# Patient Record
Sex: MALE | Race: WHITE
[De-identification: names, ages, dates, MRNs, and addresses within clinical notes are randomized per-mention and may not be internally consistent; named-entity substitution may affect disease eponyms.]

---

## 2017-05-02 ENCOUNTER — HOSPITAL ENCOUNTER (EMERGENCY)
Dept: HOSPITAL 74 - FER | Age: 42
Discharge: HOME | End: 2017-05-02
Payer: COMMERCIAL

## 2017-05-02 VITALS — DIASTOLIC BLOOD PRESSURE: 91 MMHG | TEMPERATURE: 99.2 F | HEART RATE: 73 BPM | SYSTOLIC BLOOD PRESSURE: 136 MMHG

## 2017-05-02 VITALS — BODY MASS INDEX: 28.3 KG/M2

## 2017-05-02 DIAGNOSIS — M26.621: Primary | ICD-10-CM

## 2017-05-02 DIAGNOSIS — F32.9: ICD-10-CM

## 2017-05-02 PROCEDURE — 3E0233Z INTRODUCTION OF ANTI-INFLAMMATORY INTO MUSCLE, PERCUTANEOUS APPROACH: ICD-10-PCS

## 2017-05-02 NOTE — PDOC
History of Present Illness





- General


History Source: Patient


Exam Limitations: No Limitations





- History of Present Illness


Initial Comments: 


05/02/17 20:35


The patient is a 41 year old male with no PMHx who presents to the ED with 

right ear pain and right sided jaw pain for 4 days. The patient states the pain 

began while eating. Since then, the pain has been constant. The pain is 

exacerbated by chewing. He reports he recently got a new pillow a few months ago

, which he believes may be the cause of the pain. He reports his wife recently 

had similar pain, which resolved after she stopped using the pillow. He also 

reports that he notices clicking in his jaw, and it feels like there is fluid 

in his ear, and feels off balance. He reports that he took Motrin and Advil 

with no relief. He reports he has been under a lot of stress lately, which may 

be contributing to the irritation. He denies tooth pain, throat pain, headache. 

He denies fever, chills, nausea, vomiting, diarrhea, constipation.








<Kristina Diamond - Last Filed: 05/02/17 20:35>





<Deidre Gomez - Last Filed: 05/06/17 02:39>





- General


Chief Complaint: Ear Problem


Stated Complaint: RT EAR RADIATING TO RT JAW PAIN


Time Seen by Provider: 05/02/17 19:15





Past History





<Kristina Diamond - Last Filed: 05/02/17 20:35>





- Past Medical History


Psychiatric Problems: Yes (DEPRESSION)


Other medical history: HERNIATED DISCS, LOW BACK AND NECK





- Psycho/Social/Smoking Cessation Hx


Anxiety: No


Suicidal Ideation: No


Smoking History: Never smoked


Information on smoking cessation initiated: No


Hx Alcohol Use: No


Drug/Substance Use Hx: No


Substance Use Type: None





<Deidre Gomez - Last Filed: 05/06/17 02:39>





- Past Medical History


Allergies/Adverse Reactions: 


 Allergies











Allergy/AdvReac Type Severity Reaction Status Date / Time


 


No Known Allergies Allergy   Verified 12/18/16 15:11











Home Medications: 


Ambulatory Orders





Ibuprofen [Motrin -] 600 mg PO TID PRN #21 tablet 12/18/16 


Oxycodone HCl/Acetaminophen [Percocet 5-325 mg Tablet] 1 tab PO Q6H PRN #12 

tablet MDD 4 tabs 12/18/16 


Diclofenac Sodium 75 mg PO TID PRN #20 tablet. 05/02/17 


Neomycin/Polymyxn/Hc [Cortisporin Otic Suspenstion -] 5 drop AD Q4HWA #1 bottle 

05/02/17 











**Review of Systems





- Review of Systems


Able to Perform ROS?: Yes


Comments:: 


05/02/17 20:35


CONSTITUTIONAL:


Absent: fever, no chills, no fatigue


EYES:


Absent: visual changes


ENT:


Present: right ear pain, fluid in right ear


Absent: no sore throat, tooth pain


CARDIOVASCULAR:


Absent: chest pain, no palpitations


RESPIRATORY:


Absent: cough, no SOB


GI:


Absent: abdominal pain, no nausea, no vomiting, no constipation, no diarrhea


GENITOURINARY:


Absent: dysuria, no frequency, no hematuria


MUSCULOSKELETAL:


Present: right jaw pain


Absent: back pain, no myalgia


SKIN:


Absent: rash


NEURO:


Present: off balance


Absent: headache








<DiamondKristina A - Last Filed: 05/02/17 20:35>





*Physical Exam





- Vital Signs


 Last Vital Signs











Temp Pulse Resp BP Pulse Ox


 


 99.2 F   73   18   136/91   96 


 


 05/02/17 18:45  05/02/17 18:45  05/02/17 18:45  05/02/17 18:45  05/02/17 18:45














- Physical Exam


Comments: 





05/02/17 20:37


GENERAL: The patient is awake, alert, and fully oriented, in no acute distress.


HEAD: Normal with no signs of trauma.


EYES: Pupils equal, round and reactive to light, extraocular movements intact, 

sclera anicteric, conjunctiva clear with no pallor.


ENT: Right ear canal is mildly erythematous, with no edema. Right TM is normal. 

Left ear is normal. Tenderness of the right TMJ joint, especially with opening 

of the jaw. Mild asymmetry with opening of the jaw. Otherwise pharyngeal exam 

was normal, no other tenderness on mandible or maxilla. nares patent, 

oropharynx clear without exudates.  Moist mucous membranes.


NECK: Normal range of motion, supple without lymphadenopathy, JVD, or masses.


LUNGS: Breath sounds equal, clear to auscultation bilaterally.  No wheeze/

crackles.


HEART: Regular rate and rhythm, normal S1 and S2 without murmur or rub.


ABDOMEN: Soft/nontender/nondistended. BS wnl.  No guarding or rebound.  No 

palpable masses. No hepatosplenomegaly.


EXTREMITIES: Normal range of motion, no edema.  No clubbing or cyanosis. No 

cords, erythema, or tenderness.


NEUROLOGICAL: Cranial nerves II through XII grossly intact.  Normal speech, 

normal gait.


PSYCH: Normal mood, normal affect.


SKIN: Warm, Dry, normal turgor, no rashes or lesions noted.





<Kristina Diamond - Last Filed: 05/02/17 20:35>





- Vital Signs


 Last Vital Signs











Temp Pulse Resp BP Pulse Ox


 


 99.2 F   73   18   136/91   96 


 


 05/02/17 18:45  05/02/17 18:45  05/02/17 18:45  05/02/17 18:45  05/02/17 18:45














<Deidre Gomez - Last Filed: 05/06/17 02:39>





ED Treatment Course





- Medications


Given in the ED: 


ED Medications














Discontinued Medications














Generic Name Dose Route Start Last Admin





  Trade Name Ann  PRN Reason Stop Dose Admin


 


Ketorolac Tromethamine  60 mg 05/02/17 20:11 05/02/17 20:15





  Toradol Injection -  IM 05/02/17 20:12  60 mg





  ONCE ONE   Administration














<Kristina Diamond - Last Filed: 05/02/17 20:35>





Medical Decision Making





- Medical Decision Making


Documentation has been prepared under my direction and personally reviewed by 

me in its entirety. I attest that this documented accurately reflects all work, 

treatment, procedures and medical decision making performed by me.








As noted above, this 41-year-old man presents with progressive pain around the 

right temporomandibular joint.  He has some crepitus and tenderness of this 

joint on exam.  He also has evidence of mild inflammation of the right external 

auditory canal.  Right TM is normal as is the rest of the exam.


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 

however, because of the inflammation in the auditory canal, he he may have some 

mild otitis externa.


Patient will be given a prescription for diclofenac 75 mg to be used as needed 

for pain; he will also be given Cortisporin otic suspension to be used every 4 

hours while awake.  He should follow up with an ear nose and throat group.  He 

has been given referral information for Dr. Berger [facility].  He can also follow-

up with his dentist/oral surgeon for his TMJ pain.








<Deidre Gomez - Last Filed: 05/06/17 02:39>





*DC/Admit/Observation/Transfer





- Attestations


Scribe Attestion: 





05/02/17 20:37





Documentation prepared by Kristina Diamond, acting as medical scribe for Deidre Gomez MD.





<Kristina Diamond - Last Filed: 05/02/17 20:35>





<Deidre Gomez - Last Filed: 05/06/17 02:39>


Diagnosis at time of Disposition: 


 Arthralgia of right temporomandibular joint





Otitis externa


Qualifiers:


 Otitis externa type: unspecified type Laterality: right Chronicity: acute 

Qualified Code(s): H60.501 - Unspecified acute noninfective otitis externa, 

right ear





- Discharge Dispostion


Disposition: HOME


Condition at time of disposition: Stable





- Prescriptions


Prescriptions: 


Neomycin/Polymyxn/Hc [Cortisporin Otic Suspenstion -] 5 drop AD Q4HWA #1 bottle


Diclofenac Sodium 75 mg PO TID PRN #20 tablet.dr


 PRN Reason: Pain





- Referrals


Referrals: 


Sonny Rangel MD [Primary Care Provider] - 


Martin Berger MD [Staff Physician] - Call tomorrow





- Patient Instructions


Printed Discharge Instructions:  Temporomandibular Disorder


Additional Instructions: 


Diclofenac 75 mg up to 3 times a day as needed for pain (take with food


Cortisporin eardrops: 4 drops in the ear every 4 hours while awake


Follow-up with ENT (Dr. Berger group) within 5 days


Alternatively, can see your dentist or oral surgeon for right jaw pain


Follow up with Dr. Rangel within the next week


Return to ER if symptoms worsen

## 2017-09-12 ENCOUNTER — HOSPITAL ENCOUNTER (EMERGENCY)
Dept: HOSPITAL 74 - FER | Age: 42
Discharge: HOME | End: 2017-09-12
Payer: COMMERCIAL

## 2017-09-12 VITALS — SYSTOLIC BLOOD PRESSURE: 134 MMHG | DIASTOLIC BLOOD PRESSURE: 85 MMHG | TEMPERATURE: 98.6 F | HEART RATE: 84 BPM

## 2017-09-12 VITALS — BODY MASS INDEX: 28.3 KG/M2
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
## 2017-09-12 DIAGNOSIS — S22.32XA: Primary | ICD-10-CM
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
## 2017-09-12 NOTE — PDOC
History of Present Illness





- General


Chief Complaint: Injury


Stated Complaint: FELL,LEFT RIB CAGE PAIN


Time Seen by Provider: 09/12/17 12:58


History Source: Patient


Exam Limitations: No Limitations





- History of Present Illness


Initial Comments: 


41 yo M no significant PMH presents with L sided rib pain. He states that he 

tripped while walking down stairs 2 days ago, fell onto metal hand railing. He 

noticed bruising of his L chest wall, and states that it is extremely painful. 

Worse with deep inspiration and palpation, better with rest. He has not taken 

anything for the pain. He sought evaluation when it did not improve on its own.








Past History





- Past Medical History


Allergies/Adverse Reactions: 


 Allergies











Allergy/AdvReac Type Severity Reaction Status Date / Time


 


No Known Allergies Allergy   Verified 09/12/17 12:54











Home Medications: 


Ambulatory Orders





Hydroxyzine HCl [Atarax -] 25 mg PO DAILY 09/12/17 


Ibuprofen [Motrin -] 600 mg PO TID PRN #21 tablet 09/12/17 


Oxycodone HCl/Acetaminophen [Percocet 5-325 mg Tablet] 1 tab PO Q6H PRN #12 

tablet MDD 4 tabs 09/12/17 


Quetiapine Fumarate [Seroquel] 100 tab PO HS 09/12/17 


Venlafaxine HCl [Effexor -] 50 mg PO DAILY 09/12/17 








Psychiatric Problems: Yes (DEPRESSION)





- Psycho/Social/Smoking Cessation Hx


Anxiety: No


Suicidal Ideation: No


Smoking History: Never smoked


Hx Alcohol Use: No


Drug/Substance Use Hx: No


Substance Use Type: None





**Review of Systems





- Review of Systems


Able to Perform ROS?: Yes


Comments:: 


GENERAL/CONSTITUTIONAL: No fever or chills. No weakness.


HEAD, EYES, EARS, NOSE AND THROAT: No change in vision. No ear pain or 

discharge. No sore throat.


CARDIOVASCULAR: +Chest pain. No shortness of breath.


RESPIRATORY: No cough, wheezing, or hemoptysis.


MUSCULOSKELETAL: No joint or muscle swelling or pain. No neck or back pain.


SKIN: No rash


NEUROLOGIC: No headache, vertigo, loss of consciousness, or change in strength/

sensation.











*Physical Exam





- Physical Exam


Comments: 


GENERAL: Awake, alert, and fully oriented, in no acute distress


HEAD: No signs of trauma


EYES: PERRLA, EOMI, sclera anicteric, conjunctiva clear


LUNGS: Breath sounds equal, clear to auscultation bilaterally.  No wheezes, and 

no crackles. +Splinting with deep inspiration.


HEART: Regular rate and rhythm, normal S1 and S2, no murmurs, rubs or gallops


EXTREMITIES: Normal range of motion, no edema.  No clubbing or cyanosis. No 

cords, erythema, or tenderness


NEUROLOGICAL: Cranial nerves II through XII grossly intact.  Normal speech, 

normal gait


SKIN: Warm, Dry, normal turgor, no rashes or lesions noted.











*DC/Admit/Observation/Transfer


Diagnosis at time of Disposition: 


Rib fracture


Qualifiers:


 Encounter type: initial encounter Rib fracture type: single rib Fracture type: 

closed Laterality: left Qualified Code(s): S22.32XA - Fracture of one rib, left 

side, initial encounter for closed fracture





- Discharge Dispostion


Disposition: HOME


Condition at time of disposition: Stable


Admit: No





- Prescriptions


Prescriptions: 


Ibuprofen [Motrin -] 600 mg PO TID PRN #21 tablet


 PRN Reason: Pain


Oxycodone HCl/Acetaminophen [Percocet 5-325 mg Tablet] 1 tab PO Q6H PRN #12 

tablet MDD 4 tabs


 PRN Reason: Severe Pain





- Patient Instructions


Printed Discharge Instructions:  DI for Rib Fracture

## 2017-12-18 ENCOUNTER — HOSPITAL ENCOUNTER (EMERGENCY)
Dept: HOSPITAL 74 - FER | Age: 42
Discharge: HOME | End: 2017-12-18
Payer: COMMERCIAL

## 2017-12-18 VITALS — TEMPERATURE: 98 F | HEART RATE: 69 BPM | DIASTOLIC BLOOD PRESSURE: 93 MMHG | SYSTOLIC BLOOD PRESSURE: 129 MMHG

## 2017-12-18 VITALS — BODY MASS INDEX: 29 KG/M2
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
## 2017-12-18 DIAGNOSIS — F32.9: ICD-10-CM

## 2017-12-18 DIAGNOSIS — S46.911A: Primary | ICD-10-CM

## 2017-12-18 NOTE — PDOC
History of Present Illness





- General


Chief Complaint: Pain, Acute


Stated Complaint: right shoulder pain


Time Seen by Provider: 12/18/17 07:15


History Source: Patient


Exam Limitations: No Limitations





- History of Present Illness


Initial Comments: 





12/18/17 07:26


42y M no significant pmhx presents with complaint of R shoulder pain. The pt 

was walking his dog when he slipped on ice, lasting on his R shoulder radiating 

to his upper R back and R chest. Pt complains of pain to the lateral aspect of 

his R shoulder and radiates down his arm. He denies anyhead injury, neck pain, 

back pain, numnbess, weakness, LOC.  











Past History





- Past Medical History


Allergies/Adverse Reactions: 


 Allergies











Allergy/AdvReac Type Severity Reaction Status Date / Time


 


No Known Allergies Allergy   Verified 09/12/17 12:54











Home Medications: 


Ambulatory Orders





Quetiapine Fumarate [Seroquel] 100 tab PO HS 09/12/17 


Venlafaxine HCl [Effexor -] 50 mg PO DAILY 09/12/17 








COPD: No


Psychiatric Problems: Yes (DEPRESSION)





- Suicide/Smoking/Psychosocial Hx


Smoking History: Never smoked


Have you smoked in the past 12 months: No


Hx Alcohol Use: No


Drug/Substance Use Hx: No


Substance Use Type: None





**Review of Systems





- Review of Systems


Able to Perform ROS?: Yes


Constitutional: No: Symptoms Reported, Malaise, Weakness


HEENTM: No: Eye Pain, Blurred Vision, Recent change in vision, Double Vision


ABD/GI: No: Nausea, Vomiting, Abdominal cramping


Musculoskeletal: Yes: Joint Pain.  No: Back Pain, Neck Pain


Integumentary: No: Bruising, Change in Color


Neurological: Yes: Paresthesia.  No: Numbness, Weakness





*Physical Exam





- Vital Signs


 Last Vital Signs











Temp Pulse Resp BP Pulse Ox


 


 98 F   69   16   129/93   100 


 


 12/18/17 07:13  12/18/17 07:13  12/18/17 07:13  12/18/17 07:13  12/18/17 07:13














- Physical Exam


Comments: 





12/18/17 07:38


GENERAL: The patient is awake, alert, and fully oriented, Nontoxic - in no 

acute distress.


HEAD: Normocephalic, atraumatic.


EYES: extraocular movements intact,


NECK: Normal range of motion, supple, no focal tenderness in the posterior spine


ABDOMEN: Soft, nontender, 


EXTREMITIES: Normal range of motion, no edema. mild tenderness to R anterior 

shoulder and clavical. 


NEUROLOGICAL: No facial assymetry, Normal speech, sensation/motor symmetric


SKIN: Warm, Dry, normal turgor, No bruising noted








ED Treatment Course





- RADIOLOGY


Radiology Studies Ordered: 














 Category Date Time Status


 


 CLAVICLE-RIGHT SIDE [RAD] [facility]  12/18/17 07:22 Ordered


 


 SHOULDER-RIGHT [RAD] [facility] Radiology  12/18/17 07:22 Ordered














Medical Decision Making





- Medical Decision Making





12/18/17 07:42


xray shoulder/clavical to ro fx


ibuprofen for pain   


12/18/17 08:26





on xray there are no signs of fx


there is slight widening of ac joint (~5mm) with slight elevation of clavical - 

however pt does not have significant tenderness at the area.


suspect either strain vs. mild ac separation


will recommend supportive care, NSAIDS and ortho fu in 5 days if not feeling 

better





return precautions were discussed











*DC/Admit/Observation/Transfer


Diagnosis at time of Disposition: 


Right shoulder strain


Qualifiers:


 Encounter type: initial encounter Qualified Code(s): S46.911A - Strain of 

unspecified muscle, fascia and tendon at shoulder and upper arm level, right arm

, initial encounter








- Discharge Dispostion


Disposition: HOME


Condition at time of disposition: Improved


Admit: No





- Referrals


Referrals: 


Robinson Sherman MD [Staff Physician] - 


Cisco Ceron MD [Staff Physician] - 





- Patient Instructions


Printed Discharge Instructions:  DI for Shoulder Pain


Additional Instructions: 


Please use ice to minimze swelling/inflammation


Use motrin 400mg every 6 hrs for the next 2 days.


No heavy lifting with your right arm until pain resolves.


If your pain is not resolved by 5 days, see an orthopedic surgeon for 

reeavluation. 





Print Language: ENGLISH





- Post Discharge Activity

## 2018-05-06 ENCOUNTER — HOSPITAL ENCOUNTER (EMERGENCY)
Dept: HOSPITAL 74 - FER | Age: 43
Discharge: HOME | End: 2018-05-06
Payer: COMMERCIAL

## 2018-05-06 VITALS — HEART RATE: 73 BPM | DIASTOLIC BLOOD PRESSURE: 94 MMHG | SYSTOLIC BLOOD PRESSURE: 142 MMHG | TEMPERATURE: 98.4 F

## 2018-05-06 VITALS — BODY MASS INDEX: 25.7 KG/M2

## 2018-05-06 DIAGNOSIS — F32.9: ICD-10-CM

## 2018-05-06 DIAGNOSIS — F20.9: ICD-10-CM

## 2018-05-06 DIAGNOSIS — E86.0: Primary | ICD-10-CM

## 2018-05-06 LAB
ALBUMIN SERPL-MCNC: 4.4 G/DL (ref 3.5–5)
ALP SERPL-CCNC: 47 U/L (ref 32–92)
ALT SERPL-CCNC: 29 U/L (ref 10–40)
ANION GAP SERPL CALC-SCNC: 6 MMOL/L (ref 8–16)
AST SERPL-CCNC: 27 U/L (ref 10–42)
BASOPHILS # BLD: 2.2 % (ref 0–2)
BILIRUB SERPL-MCNC: 0.6 MG/DL (ref 0.2–1)
BUN SERPL-MCNC: 13 MG/DL (ref 7–18)
CALCIUM SERPL-MCNC: 9.3 MG/DL (ref 8.4–10.2)
CHLORIDE SERPL-SCNC: 102 MMOL/L (ref 98–107)
CO2 SERPL-SCNC: 27 MMOL/L (ref 22–28)
COLOR UR: YELLOW
CREAT SERPL-MCNC: 0.8 MG/DL (ref 0.6–1.3)
DEPRECATED RDW RBC AUTO: 12.8 % (ref 11.9–15.9)
EOSINOPHIL # BLD: 3.7 % (ref 0–4.5)
GLUCOSE SERPL-MCNC: 102 MG/DL (ref 74–106)
HCT VFR BLD CALC: 45.4 % (ref 35.4–49)
HGB BLD-MCNC: 15.6 GM/DL (ref 11.7–16.9)
LYMPHOCYTES # BLD: 34.6 % (ref 8–40)
MCH RBC QN AUTO: 28.5 PG (ref 25.7–33.7)
MCHC RBC AUTO-ENTMCNC: 34.3 G/DL (ref 32–35.9)
MCV RBC: 83.1 FL (ref 80–96)
MONOCYTES # BLD AUTO: 7 % (ref 3.8–10.2)
NEUTROPHILS # BLD: 52.5 % (ref 42.8–82.8)
PH UR: 5.5 [PH] (ref 4.5–8)
PLATELET # BLD AUTO: 291 K/MM3 (ref 134–434)
PMV BLD: 7.6 FL (ref 7.5–11.1)
POTASSIUM SERPLBLD-SCNC: 4.2 MMOL/L (ref 3.5–5.1)
PROT SERPL-MCNC: 8.1 G/DL (ref 6.4–8.3)
RBC # BLD AUTO: 5.46 M/MM3 (ref 4–5.6)
SODIUM SERPL-SCNC: 135 MMOL/L (ref 136–145)
SP GR UR: 1.01 (ref 1–1.02)
UROBILINOGEN UR STRIP-MCNC: 0.2 MG/DL (ref 0.2–1)
WBC # BLD AUTO: 7.5 K/MM3 (ref 4–10.8)

## 2018-05-06 PROCEDURE — 3E0337Z INTRODUCTION OF ELECTROLYTIC AND WATER BALANCE SUBSTANCE INTO PERIPHERAL VEIN, PERCUTANEOUS APPROACH: ICD-10-PCS | Performed by: STUDENT IN AN ORGANIZED HEALTH CARE EDUCATION/TRAINING PROGRAM

## 2018-05-06 NOTE — EKG
Test Reason : 

Blood Pressure : ***/*** mmHG

Vent. Rate : 058 BPM     Atrial Rate : 058 BPM

   P-R Int : 162 ms          QRS Dur : 086 ms

    QT Int : 404 ms       P-R-T Axes : 080 025 -01 degrees

   QTc Int : 396 ms

 

SINUS BRADYCARDIA

OTHERWISE NORMAL ECG

WHEN COMPARED WITH ECG OF 21-AUG-2005 18:03,

VENT. RATE HAS DECREASED BY  40 BPM

T WAVE AMPLITUDE HAS INCREASED IN ANTEROLATERAL LEADS

Confirmed by MD Mario, Bridger ([id number]) on 5/6/2018 4:11:48 PM

 

Referred By: STANISLAW GALE           Confirmed By:Bridger Martin MD

## 2018-05-06 NOTE — PDOC
History of Present Illness





- General


Chief Complaint: Lightheaded


Stated Complaint: DIZZY


Time Seen by Provider: 05/06/18 11:52


History Source: Patient


Exam Limitations: No Limitations





- History of Present Illness


Initial Comments: 





05/06/18 12:06


42y M hx of psychiatric disoroders (depression, schizophrenia) presents with 

complaint of not feeling well. for the past 3 days pt notes that for the past 

few days he has been feeling 'dehydrated', that he describes as feeling light 

headed, fatigued and more tired than usual. pt denies any double vision, 

headache, cp, sob, newberry, n/v, diaphoresis, diarrhea, melena, bpr, dysuria, 

hematuria, fever/chills, cough, rashes, sore throat, congestion, numbness/

tingling/weakness. pt does endorses some occasional knee pain since his fall 

last year but nothing new recently 





pt also notes that he stopped taking his meds approx 2 weeks ago because he 

didnt feel wella nd thought it was his meds cusing his problems.  





pt presented today as the next appt his doctor had was later this week





Past History





- Past Medical History


Allergies/Adverse Reactions: 


 Allergies











Allergy/AdvReac Type Severity Reaction Status Date / Time


 


No Known Allergies Allergy   Verified 05/06/18 11:56











Home Medications: 


Ambulatory Orders





Quetiapine Fumarate [Seroquel] 100 tab PO HS 09/12/17 


Venlafaxine HCl [Effexor -] 50 mg PO DAILY 09/12/17 








COPD: No


Psychiatric Problems: Yes (DEPRESSION)





- Suicide/Smoking/Psychosocial Hx


Smoking History: Never smoked


Have you smoked in the past 12 months: No


Hx Alcohol Use: No


Drug/Substance Use Hx: No


Substance Use Type: None





**Review of Systems





- Review of Systems


Able to Perform ROS?: Yes


Comments:: 





05/06/18 12:09


Constitutional - +malaise no reported Fever, Chills, 


HEENT: no reported vision changes, sore throat


Respiratory: no reported cough, sob, hemoptysis


Cardiac: no reported chest pain, palpitations, light headedness, leg swelling


Abd/GI: no reported abd pain, nausea, vomiting, blood per rectum, melena, 

diarrhea


: no reported dysuria, frequency, discharge


Musculskelatal - +occasional R knee pain no reported back pain,


skin - no reported bruising, erythema, rash


neurological: no reported headache, numbness, focal weakness, tingling, ataxia, 


hematologic: no reported  easy bruising, easy bleeding








*Physical Exam





- Vital Signs


 Last Vital Signs











Temp Pulse Resp BP Pulse Ox


 


 98.4 F   73   16   142/94   98 


 


 05/06/18 11:50  05/06/18 11:50  05/06/18 11:50  05/06/18 11:50  05/06/18 11:50














- Physical Exam


Comments: 





05/06/18 12:10


GENERAL: The patient is awake, alert, and fully oriented, Nontoxic - in no 

acute distress.


HEAD: Normocephalic, atraumatic.


EYES: extraocular movements intact, sclera anicteric, conjunctiva clear.


ENT: Normal voice,  dry mucous membranes.


NECK: Normal range of motion, supple


LUNGS: Breath sounds equal, clear to auscultation bilaterally.  No wheezes, no 

rhonchi, no rales.


HEART: Regular rate and rhythm, normal S1 and S2 without murmur, rub or gallop.


ABDOMEN: Soft, nontender, normoactive bowel sounds.  No guarding, no rebound.  

. No CVA tenderness


EXTREMITIES: Normal range of motion, no edema.  No clubbing or cyanosis. No 

cords, erythema, or tenderness.


NEUROLOGICAL: No facial assymetry, Normal speech, 


PSYCH: Normal mood, normal affect.


SKIN: Warm, Dry, normal turgor,





**Heart Score/ECG Review





- ECG Impressions


Comment:: 





05/06/18 13:18


Twelve-lead EKG was performed and reviewed by me. 


There is normal sinus rhythm with a rate of 58


T wave inversion in lead 3





ED Treatment Course





- LABORATORY


CBC & Chemistry Diagram: 


 05/06/18 12:20





 05/06/18 12:20





Medical Decision Making





- Medical Decision Making





05/06/18 12:10


42y M hx of depression, schizophrnia presents with feeling malaise/fatigue/

lightheaded (nonvertignous) without other focal complaints


exam unremarkble besides mildly dry mm


ddx anemia, metabolic derangement, occult infection


will screen for acs with ekg


will give fluids for hdyration


willre assess


05/06/18 13:19


pt feeling improved


The patient's blood work was unremarkable,


will dc the pt with pmd fu





I discussed the physical exam findings, ancillary test results and final 

diagnoses with the patient. I answered all of the patient's questions. The 

patient was satisfied with the care received and felt comfortable with the 

discharge plan and treatment plan. The patient will call their primary care 

physician within 24 hours to arrange follow-up and will return to the Emergency 

Department with any new, persistent or worsening symptoms. 








*DC/Admit/Observation/Transfer


Diagnosis at time of Disposition: 


 Dehydration








- Discharge Dispostion


Disposition: HOME


Condition at time of disposition: Improved


Admit: No





- Referrals


Referrals: 


Sonny Rangel MD [Staff Physician] - 





- Patient Instructions


Printed Discharge Instructions:  DI for Dehydration -- Adult


Additional Instructions: 


Return to the emergency department immediately with ANY new, persistent or 

worsening symptoms.





Make sure you are staying well hydrated.





You MUST call and follow up with your doctor tomorrow for further evaluation of 

your symptoms. Results were discussed with you. Please make sure your doctor 

reviews the results of your emergency evaluation.





Print Language: ENGLISH





- Post Discharge Activity

## 2019-03-14 ENCOUNTER — HOSPITAL ENCOUNTER (EMERGENCY)
Dept: HOSPITAL 74 - FER | Age: 44
Discharge: HOME | End: 2019-03-14
Payer: COMMERCIAL

## 2019-03-14 VITALS — DIASTOLIC BLOOD PRESSURE: 100 MMHG | HEART RATE: 64 BPM | SYSTOLIC BLOOD PRESSURE: 140 MMHG

## 2019-03-14 VITALS — TEMPERATURE: 98.5 F

## 2019-03-14 VITALS — BODY MASS INDEX: 27.7 KG/M2

## 2019-03-14 DIAGNOSIS — F20.9: ICD-10-CM

## 2019-03-14 DIAGNOSIS — F32.9: ICD-10-CM

## 2019-03-14 DIAGNOSIS — M21.372: Primary | ICD-10-CM

## 2019-03-14 LAB
ALBUMIN SERPL-MCNC: 4 G/DL (ref 3.4–5)
ALP SERPL-CCNC: 41 U/L (ref 45–117)
ALT SERPL-CCNC: 33 U/L (ref 13–61)
ANION GAP SERPL CALC-SCNC: 6 MMOL/L (ref 8–16)
AST SERPL-CCNC: 29 U/L (ref 15–37)
BASOPHILS # BLD: 0.8 % (ref 0–2)
BILIRUB SERPL-MCNC: 0.3 MG/DL (ref 0.2–1)
BUN SERPL-MCNC: 14 MG/DL (ref 7–18)
CALCIUM SERPL-MCNC: 8.9 MG/DL (ref 8.5–10)
CHLORIDE SERPL-SCNC: 103 MMOL/L (ref 98–107)
CO2 SERPL-SCNC: 27 MMOL/L (ref 21–32)
CREAT SERPL-MCNC: 0.7 MG/DL (ref 0.55–1.3)
DEPRECATED RDW RBC AUTO: 13.4 % (ref 11.9–15.9)
EOSINOPHIL # BLD: 4.4 % (ref 0–4.5)
GLUCOSE SERPL-MCNC: 91 MG/DL (ref 74–106)
HCT VFR BLD CALC: 43.5 % (ref 35.4–49)
HGB BLD-MCNC: 14.4 GM/DL (ref 11.7–16.9)
LYMPHOCYTES # BLD: 37.7 % (ref 8–40)
MCH RBC QN AUTO: 28.3 PG (ref 25.7–33.7)
MCHC RBC AUTO-ENTMCNC: 33.1 G/DL (ref 32–35.9)
MCV RBC: 85.3 FL (ref 80–96)
MONOCYTES # BLD AUTO: 8.7 % (ref 3.8–10.2)
NEUTROPHILS # BLD: 48.4 % (ref 42.8–82.8)
PLATELET # BLD AUTO: 238 K/MM3 (ref 134–434)
PMV BLD: 8.4 FL (ref 7.5–11.1)
POTASSIUM SERPLBLD-SCNC: 3.9 MMOL/L (ref 3.5–5.1)
PROT SERPL-MCNC: 7.3 G/DL (ref 6.4–8.2)
RBC # BLD AUTO: 5.1 M/MM3 (ref 4–5.6)
SODIUM SERPL-SCNC: 136 MMOL/L (ref 136–145)
WBC # BLD AUTO: 7.8 K/MM3 (ref 4–10.8)

## 2019-03-14 PROCEDURE — 3E0337Z INTRODUCTION OF ELECTROLYTIC AND WATER BALANCE SUBSTANCE INTO PERIPHERAL VEIN, PERCUTANEOUS APPROACH: ICD-10-PCS | Performed by: EMERGENCY MEDICINE

## 2019-03-14 PROCEDURE — 3E033NZ INTRODUCTION OF ANALGESICS, HYPNOTICS, SEDATIVES INTO PERIPHERAL VEIN, PERCUTANEOUS APPROACH: ICD-10-PCS | Performed by: EMERGENCY MEDICINE

## 2019-03-14 NOTE — PDOC
History of Present Illness





- General


Chief Complaint: Weakness


Stated Complaint: FEELS  WEAK  LEFT LEG PAIN


Time Seen by Provider: 03/14/19 07:55





- History of Present Illness


Initial Comments: 





03/14/19 14:14





43 years old past medical history significant for schizophrenia has past 

medical history notable for cervical and lumbar disc disease no history of 

trauma presents to the ED with new weakness to his left foot and left leg for 2-

3 days. No direct history of trauma no new injuries patient states he has been 

feeling generally a little bit weak but has noticed significant weakness in his 

foot and leg





No fever no chills no chest pain or shortness of breath no recent surgeries no 

recent injections no recent hospitalizations. Symptoms are mild to moderate 

persistent concent no exacerbating or alleviating factors.








Past History





- Past Medical History


Allergies/Adverse Reactions: 


 Allergies











Allergy/AdvReac Type Severity Reaction Status Date / Time


 


No Known Allergies Allergy   Verified 03/14/19 07:57











Home Medications: 


Ambulatory Orders





Quetiapine Fumarate [Seroquel] 100 tab PO HS 09/12/17 


Methylprednisolone [Medrol Dose Amari] 4 mg PO ASDIR #21 tablet 03/14/19 








COPD: No


Psychiatric Problems: Yes (DEPRESSION, PANIC ATTACKS,SCHITZOPHRENIA)





- Suicide/Smoking/Psychosocial Hx


Smoking History: Never smoked


Have you smoked in the past 12 months: No


Information on smoking cessation initiated: No


Hx Alcohol Use: No


Drug/Substance Use Hx: No


Substance Use Type: None





**Review of Systems





- Review of Systems


Comments:: 





03/14/19 14:14





ROS:  A complete review of 10 out of 10 review of systems is taken and is 

negative apart from what is previously mentioned below and in the HPI.








*Physical Exam





- Vital Signs


 Last Vital Signs











Temp Pulse Resp BP Pulse Ox


 


 98.5 F   90   16   144/101 H  97 


 


 03/14/19 07:49  03/14/19 07:49  03/14/19 07:49  03/14/19 07:49  03/14/19 07:49














- Physical Exam


Comments: 





03/14/19 14:14





Vitals: Triage Vital signs reviewed  


General Appearance:  no acute distress, well nourished well developed, 


Head: Atraumatic, 


Neck:  Supple;No Nucal rigidity


Chest Wall: Nontender


Cardiac: Regular rate and rhythym, no murmurs, no rubs, no gallops, 


Lungs: Clear to auscultation bilateral, good air movement bilaterally,


Abdomen:  Soft, non distended, normal bowel sounds, non tender to palpation


Rectal: Good rectal tone, no S1 and S2 anesthesia


Extremities: Full range of motion to all extremities, no cyanosis, clubbing, or 

edema


Skin:  Warm and dry, no rashes or lesions, no rash, no petechiae


Neuro: AOX3; Cranial Nerves 2-12 grossly intact,3/5 strength to left foot with 

dorsiflexion normal with plantar flexion, 4-5 strength with extension at the 

knee, reflexes intact,, Sensation intact to all extremities,gait normal


Psych:  normal mood, normal affect





Moderate Sedation





- Procedure Monitoring


Vital Signs: 


Procedure Monitoring Vital Signs











Temperature  98.5 F   03/14/19 07:49


 


Pulse Rate  90   03/14/19 07:49


 


Respiratory Rate  16   03/14/19 07:49


 


Blood Pressure  144/101 H  03/14/19 07:49


 


O2 Sat by Pulse Oximetry (%)  97   03/14/19 07:49











ED Treatment Course





- LABORATORY


CBC & Chemistry Diagram: 


 03/14/19 09:00





 03/14/19 09:00





Medical Decision Making





- Medical Decision Making





03/14/19 14:22





Exam concerning for left foot drop there is slight weakness at the knee as well 

this may be secondary to pain given history of underlying disc disease case 

discussed with neurosurgery recommends MRI cervical and MRI lumbar if any acute 

findings patient may require emergent surgery





Dr. Parson following





03/14/19 14:47





MRI reviewed by neurosurgeon Dr. Harrell no significant disc disease in the LS-

spine but there is some moderate disc disease and cervical disease that is 

necessary for consultation but on a nonemergent basis





Recommends Medrol Dosepak and he will see the patient is office within 1 week





Findings, the need for follow-up and strict return instructions discussed with 

patient.








*DC/Admit/Observation/Transfer


Diagnosis at time of Disposition: 


 Foot drop, left








- Discharge Dispostion


Disposition: HOME


Decision to Admit order: No





- Referrals


Referrals: 


Ellis Palma MD, [facility] [Staff Physician] - 





- Patient Instructions


Printed Discharge Instructions:  Herniated Disc


Additional Instructions: 


Call today to follow-up with Dr. Palma this week.  Take Medrol Dosepak as 

prescribed. Okay to stop medication if any adverse psychiatric side effects





Return to ED for any severe worsening symptoms or for any concerns.





- Post Discharge Activity

## 2020-07-03 ENCOUNTER — HOSPITAL ENCOUNTER (EMERGENCY)
Dept: HOSPITAL 74 - FER | Age: 45
Discharge: HOME | End: 2020-07-03
Payer: COMMERCIAL

## 2020-07-03 VITALS — SYSTOLIC BLOOD PRESSURE: 123 MMHG | HEART RATE: 84 BPM | TEMPERATURE: 98.4 F | DIASTOLIC BLOOD PRESSURE: 84 MMHG

## 2020-07-03 VITALS — BODY MASS INDEX: 27.7 KG/M2

## 2020-07-03 DIAGNOSIS — J02.9: Primary | ICD-10-CM

## 2020-07-03 NOTE — PDOC
Documentation entered by Marlene Andersen SCRIBE, acting as scribe for 

Blanche Martinez MD.








Blanche Martinez MD:  This documentation has been prepared by the khalifibeGanesh Lincy, SCRIBE, under my direction and personally reviewed by me in its 

entirety.  I confirm that the documentation accurately reflects all work, 

treatment, procedures, and medical decision making performed by me.  





History of Present Illness





- General


Chief Complaint: Pain


Stated Complaint: LEFT EAR, NECK PAIN


History Source: Patient


Exam Limitations: No Limitations





- History of Present Illness


Initial Comments: 


07/03/20 14:45


The patient is a 45-year-old male with no past medical history who presents to 

the emergency department with left ear and neck pain since yesterday, no relief 

with Tylenol. The patient reports a history of seasonal allergies, however 

denies these symptoms being similar to prior allergy symptoms. Denies change to 

hearing. Denies fever, chills, cough. Denies known sick contact. Denies the use 

of tobacco or alcohol. 





Allergies: Seasonal allergies 


PCP: Dr. Rangel. 





Past History





- Medical History


Allergies/Adverse Reactions: 


                                    Allergies











Allergy/AdvReac Type Severity Reaction Status Date / Time


 


No Known Allergies Allergy   Verified 07/03/20 14:15











Home Medications: 


Ambulatory Orders





Acetaminophen [Tylenol -] 1,000 mg PO ONCE 07/03/20 








COPD: No


Psychiatric Problems: Yes (DEPRESSION, PANIC ATTACKS,SCHITZOPHRENIA)





- Psycho-Social/Smoking History


Smoking History: Never smoked


Have you smoked in the past 12 months: No


Information on smoking cessation initiated: No





- Substance Abuse Hx (Audit-C & DAST Scrn)


How often the patient has a drink containing alcohol: Never


Score: In Men: 4 or > Positive; In Women: 3 or > Positive: 0


Screen Result (Pos requires Nsg. Audit-10AR): Negative


In the last yr the pt used illegal drug/Rx for NonMed reason: No


Score:  Yes response is considered Positive: 0


Screen Result (Positive result requires Nsg. DAST-10): Negative





**Review of Systems





- Review of Systems


Able to Perform ROS?: Yes


Comments:: 


07/03/20 14:46


GENERAL/CONSTITUTIONAL: No fever or chills. No weakness. Denies sick contact. 


HEAD, EYES, EARS, NOSE AND THROAT: +left ear and neck pain. No change in vision.

No ear discharge or change in hearing. 


CARDIOVASCULAR: No chest pain or shortness of breath.


RESPIRATORY: No cough, wheezing, or hemoptysis.


GASTROINTESTINAL: No nausea, vomiting, diarrhea or constipation.


GENITOURINARY: No dysuria, frequency, or change in urination.


MUSCULOSKELETAL: No joint or muscle swelling or pain. No neck or back pain.


SKIN: No rash


NEUROLOGIC: No headache, vertigo, loss of consciousness, or change in 

strength/sensation.


ENDOCRINE: No increased thirst. No abnormal weight change.


HEMATOLOGIC/LYMPHATIC: No anemia, easy bleeding, or history of blood clots.


ALLERGIC/IMMUNOLOGIC: No hives or skin allergy.





*Physical Exam





- Vital Signs


                                Last Vital Signs











Temp Pulse Resp BP Pulse Ox


 


 98.4 F   84   18   123/84   100 


 


 07/03/20 14:15  07/03/20 14:15  07/03/20 14:15  07/03/20 14:15  07/03/20 14:15














- Physical Exam





07/03/20 14:41


Awake alert no acute distress lungs are clear bilaterally heart is regular 30 

murmurs rubs or gallops HEENT exam throat is without erythema there is posterior

pharynx cobblestoning tender lymph nodes in the submandibular region on the left

no tonsillar exudates no tonsillar erythema TMs are clear with serous effusion 

bilateral no bulging skin is warm and dry no rash patient is awake alert and 

oriented x3





Medical Decision Making





- Medical Decision Making





07/03/20 14:42


45-year-old male no past medical history here today with a sore throat and ear 

pain.  Denies any fevers chills cough nausea vomiting no shortness of breath no 

sick contacts





On my physical exam there is no tonsillar exudates but large posterior pharynx 

cobblestoning bilateral turbinate enlargement patient has postnasal drip likely 

reactive lymphadenopathy due to allergy sinusitis rhinitis and pharyngitis given

Motrin discharge home with follow-up with ENT recommend Flonase





Discharge





- Discharge Information


Problems reviewed: Yes


Clinical Impression/Diagnosis: 


 Post-nasal discharge, Pharyngitis





Condition: Stable


Disposition: HOME





- Admission


No





- Follow up/Referral


Referrals: 


Vicente Bush MD [Staff Physician] - 





- Patient Discharge Instructions


Patient Printed Discharge Instructions:  Sore Throat, Allergic Rhinitis


Additional Instructions: 


You can take Motrin 600 mg every 8 hours as needed for pain.  You should follow-

up with your primary care doctor.  For persistent pain beyond 1 week and follow-

up with an ear nose and throat doctor see the referral information for Dr. John Soliz and call to schedule.  You should use Flonase nasal spray 1 spray 

each nostril daily to prevent postnasal drip which is likely contributing to 

your sore throat and reactive lymphadenopathy.  It is available over-the-counter

and no prescription is required





- Post Discharge Activity

## 2022-01-07 ENCOUNTER — HOSPITAL ENCOUNTER (EMERGENCY)
Dept: HOSPITAL 74 - FER | Age: 47
Discharge: HOME | End: 2022-01-07
Payer: COMMERCIAL

## 2022-01-07 VITALS — DIASTOLIC BLOOD PRESSURE: 90 MMHG | TEMPERATURE: 98.3 F | SYSTOLIC BLOOD PRESSURE: 147 MMHG | HEART RATE: 88 BPM

## 2022-01-07 VITALS — BODY MASS INDEX: 27.7 KG/M2

## 2022-01-07 DIAGNOSIS — J02.9: Primary | ICD-10-CM

## 2022-01-07 DIAGNOSIS — J01.90: ICD-10-CM

## 2022-01-07 DIAGNOSIS — H92.09: ICD-10-CM

## 2022-01-07 PROCEDURE — C9803 HOPD COVID-19 SPEC COLLECT: HCPCS

## 2022-01-07 PROCEDURE — U0005 INFEC AGEN DETEC AMPLI PROBE: HCPCS

## 2022-01-07 PROCEDURE — U0003 INFECTIOUS AGENT DETECTION BY NUCLEIC ACID (DNA OR RNA); SEVERE ACUTE RESPIRATORY SYNDROME CORONAVIRUS 2 (SARS-COV-2) (CORONAVIRUS DISEASE [COVID-19]), AMPLIFIED PROBE TECHNIQUE, MAKING USE OF HIGH THROUGHPUT TECHNOLOGIES AS DESCRIBED BY CMS-2020-01-R: HCPCS

## 2022-02-09 ENCOUNTER — HOSPITAL ENCOUNTER (EMERGENCY)
Dept: HOSPITAL 74 - FER | Age: 47
Discharge: HOME | End: 2022-02-09
Payer: COMMERCIAL

## 2022-02-09 VITALS — HEART RATE: 76 BPM | TEMPERATURE: 98.2 F | SYSTOLIC BLOOD PRESSURE: 149 MMHG | DIASTOLIC BLOOD PRESSURE: 105 MMHG

## 2022-02-09 VITALS — BODY MASS INDEX: 26.1 KG/M2

## 2022-02-09 DIAGNOSIS — M62.838: ICD-10-CM

## 2022-02-09 DIAGNOSIS — G56.02: Primary | ICD-10-CM

## 2022-02-09 PROCEDURE — 3E0233Z INTRODUCTION OF ANTI-INFLAMMATORY INTO MUSCLE, PERCUTANEOUS APPROACH: ICD-10-PCS | Performed by: EMERGENCY MEDICINE

## 2022-05-23 PROBLEM — Z00.00 ENCOUNTER FOR PREVENTIVE HEALTH EXAMINATION: Status: ACTIVE | Noted: 2022-05-23

## 2022-05-24 ENCOUNTER — APPOINTMENT (OUTPATIENT)
Dept: GASTROENTEROLOGY | Facility: CLINIC | Age: 47
End: 2022-05-24

## 2023-05-19 ENCOUNTER — HOSPITAL ENCOUNTER (EMERGENCY)
Dept: HOSPITAL 74 - FER | Age: 48
Discharge: HOME | End: 2023-05-19
Payer: COMMERCIAL

## 2023-05-19 VITALS
DIASTOLIC BLOOD PRESSURE: 99 MMHG | RESPIRATION RATE: 18 BRPM | SYSTOLIC BLOOD PRESSURE: 133 MMHG | HEART RATE: 81 BPM | TEMPERATURE: 98.5 F

## 2023-05-19 VITALS — BODY MASS INDEX: 26.4 KG/M2

## 2023-05-19 DIAGNOSIS — M54.42: Primary | ICD-10-CM
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
